# Patient Record
Sex: MALE | Race: WHITE | NOT HISPANIC OR LATINO | URBAN - METROPOLITAN AREA
[De-identification: names, ages, dates, MRNs, and addresses within clinical notes are randomized per-mention and may not be internally consistent; named-entity substitution may affect disease eponyms.]

---

## 2019-10-10 ENCOUNTER — EMERGENCY (EMERGENCY)
Facility: HOSPITAL | Age: 34
LOS: 1 days | Discharge: HOME | End: 2019-10-10
Attending: EMERGENCY MEDICINE
Payer: COMMERCIAL

## 2019-10-10 VITALS
SYSTOLIC BLOOD PRESSURE: 131 MMHG | DIASTOLIC BLOOD PRESSURE: 60 MMHG | OXYGEN SATURATION: 97 % | TEMPERATURE: 98 F | RESPIRATION RATE: 20 BRPM | WEIGHT: 279.99 LBS | HEIGHT: 71 IN | HEART RATE: 100 BPM

## 2019-10-10 VITALS
TEMPERATURE: 98 F | OXYGEN SATURATION: 98 % | DIASTOLIC BLOOD PRESSURE: 83 MMHG | SYSTOLIC BLOOD PRESSURE: 134 MMHG | HEART RATE: 98 BPM | RESPIRATION RATE: 20 BRPM

## 2019-10-10 PROCEDURE — 99283 EMERGENCY DEPT VISIT LOW MDM: CPT

## 2019-10-10 RX ORDER — CEPHALEXIN 500 MG
500 CAPSULE ORAL ONCE
Refills: 0 | Status: COMPLETED | OUTPATIENT
Start: 2019-10-10 | End: 2019-10-10

## 2019-10-10 RX ORDER — CEPHALEXIN 500 MG
1 CAPSULE ORAL
Qty: 28 | Refills: 0
Start: 2019-10-10 | End: 2019-10-16

## 2019-10-10 RX ADMIN — Medication 500 MILLIGRAM(S): at 15:10
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
## 2019-10-10 NOTE — ED PROVIDER NOTE - PATIENT PORTAL LINK FT
You can access the FollowMyHealth Patient Portal offered by [facility] by registering at the following website: [email or web address]. By joining [employer]’s FollowMyHealth portal, you will also be able to view your health information using other applications (apps) compatible with our system.

## 2019-10-10 NOTE — ED PROVIDER NOTE - NSFOLLOWUPCLINICS_GEN_ALL_ED_FT
[facility]  Burn  [address] - [address] [place] [address]  Phone: [phone]  Fax: [phone]  Follow Up Time:     [facility]  Burn  [address] [place] [id number]  Phone: [phone]  Fax:   Follow Up Time:     [facility]  Burn  [address], [address]  [place] [address]  Phone: [phone]  Fax:   Follow Up Time:

## 2019-10-12 ENCOUNTER — EMERGENCY (EMERGENCY)
Facility: HOSPITAL | Age: 34
LOS: 1 days | Discharge: HOME | End: 2019-10-12
Attending: EMERGENCY MEDICINE
Payer: COMMERCIAL

## 2019-10-12 VITALS
HEIGHT: 71 IN | SYSTOLIC BLOOD PRESSURE: 132 MMHG | DIASTOLIC BLOOD PRESSURE: 86 MMHG | HEART RATE: 102 BPM | WEIGHT: 278 LBS | TEMPERATURE: 99 F | RESPIRATION RATE: 17 BRPM | OXYGEN SATURATION: 97 %

## 2019-10-12 VITALS
TEMPERATURE: 98 F | DIASTOLIC BLOOD PRESSURE: 82 MMHG | RESPIRATION RATE: 18 BRPM | HEART RATE: 84 BPM | SYSTOLIC BLOOD PRESSURE: 118 MMHG | OXYGEN SATURATION: 98 %

## 2019-10-12 PROCEDURE — 99283 EMERGENCY DEPT VISIT LOW MDM: CPT

## 2019-10-12 RX ORDER — ACETAMINOPHEN 500 MG
975 TABLET ORAL ONCE
Refills: 0 | Status: COMPLETED | OUTPATIENT
Start: 2019-10-12 | End: 2019-10-12

## 2019-10-12 RX ADMIN — Medication 975 MILLIGRAM(S): at 15:15
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
## 2019-10-12 NOTE — ED PROVIDER NOTE - PATIENT PORTAL LINK FT
You can access the FollowMyHealth Patient Portal offered by [facility] by registering at the following website: [email or web address]. By joining [employer]’s FollowMyHealth portal, you will also be able to view your health information using other applications (apps) compatible with our system.
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
## 2021-05-07 NOTE — ED PROVIDER NOTE - CCCP TRG CHIEF CMPLNT
Chelo Nolasco is a 78year old male. Patient presents with:  Consult: former [facility] patient  CAD: hx PTCA w/ stent 1995, CABG X3  2005  Dyspnea    HPI:   Patient comes in today for consultation. He sees Dr. Mitzi Lorenz at [facility] for cardiology.   He for Chest pain. • omeprazole 20 MG Oral Capsule Delayed Release Take 40 mg by mouth every morning before breakfast.     • Vitamin D3 50 MCG (2000 UT) Oral Cap Take 2,000 Units by mouth daily. • Melatonin 10 MG Oral Tab Take 10 mg by mouth.        • history of coronary disease 1990s and bypass in 2005 has been fairly well been followed at [facility] by cardiologist over there.   Now is switching to [facility].  Having any chest pain but he is noting some shortness of breath fatigue when he is going up stairs left foot burn

## 2022-01-07 ENCOUNTER — EMERGENCY (EMERGENCY)
Facility: HOSPITAL | Age: 37
LOS: 1 days | Discharge: HOME | End: 2022-01-07
Attending: EMERGENCY MEDICINE
Payer: COMMERCIAL

## 2022-01-07 VITALS
HEIGHT: 71 IN | DIASTOLIC BLOOD PRESSURE: 80 MMHG | TEMPERATURE: 100 F | HEART RATE: 95 BPM | RESPIRATION RATE: 18 BRPM | OXYGEN SATURATION: 95 % | WEIGHT: 274.92 LBS | SYSTOLIC BLOOD PRESSURE: 119 MMHG

## 2022-01-07 PROCEDURE — 99284 EMERGENCY DEPT VISIT MOD MDM: CPT

## 2022-01-08 VITALS
DIASTOLIC BLOOD PRESSURE: 75 MMHG | SYSTOLIC BLOOD PRESSURE: 127 MMHG | HEART RATE: 80 BPM | RESPIRATION RATE: 17 BRPM | TEMPERATURE: 100 F | OXYGEN SATURATION: 99 %

## 2022-01-08 PROBLEM — Z78.9 OTHER SPECIFIED HEALTH STATUS: Chronic | Status: ACTIVE | Noted: 2019-10-12

## 2022-01-08 LAB
FLUAV AG NPH QL: SIGNIFICANT CHANGE UP
FLUBV AG NPH QL: SIGNIFICANT CHANGE UP
RSV RNA NPH QL NAA+NON-PROBE: SIGNIFICANT CHANGE UP
SARS-COV-2 RNA SPEC QL NAA+PROBE: DETECTED

## 2022-01-08 PROCEDURE — 99283 EMERGENCY DEPT VISIT LOW MDM: CPT

## 2022-01-08 PROCEDURE — 87637 SARSCOV2&INF A&B&RSV AMP PRB: CPT

## 2022-01-08 RX ORDER — IBUPROFEN 200 MG
1 TABLET ORAL
Qty: 20 | Refills: 0
Start: 2022-01-08

## 2022-01-08 RX ORDER — IBUPROFEN 200 MG
600 TABLET ORAL ONCE
Refills: 0 | Status: COMPLETED | OUTPATIENT
Start: 2022-01-08 | End: 2022-01-08

## 2022-01-08 RX ORDER — ONDANSETRON 8 MG/1
1 TABLET, FILM COATED ORAL
Qty: 10 | Refills: 0
Start: 2022-01-08

## 2022-01-08 RX ORDER — ONDANSETRON 8 MG/1
4 TABLET, FILM COATED ORAL ONCE
Refills: 0 | Status: COMPLETED | OUTPATIENT
Start: 2022-01-08 | End: 2022-01-08

## 2022-01-08 RX ADMIN — Medication 600 MILLIGRAM(S): at 01:07

## 2022-01-08 RX ADMIN — ONDANSETRON 4 MILLIGRAM(S): 8 TABLET, FILM COATED ORAL at 01:07

## 2022-01-08 NOTE — ED PROVIDER NOTE - NSFOLLOWUPINSTRUCTIONS_ED_ALL_ED_FT
You were seen and evaluated in the Emergency Department for your viral upper respiratory symptoms, possibly COVID. As we are not testing patients for COVID that are stable for discharge, you should self-quarantine for presumed COVID. Please see the attached COVID information packet for management of your symptoms, and more information on the next steps including your self-quarantine measures. Remaining self-quarantined is crucial to limiting the spread of the virus.  You may also refer to the CDC website for more information: https://www.cdc.gov/coronavirus/2019-ncov/if-you-are-sick/steps-when-sick.html.      - Do NOT go to work, school or public areas.  Avoid using public transportation, ride sharing or taxis.  - Wear a mask whenever you are around other people.  - Avoid sharing personal household items.  You should NOT share dishes, drinking glasses, cups, eating utensils, towels or bedding with other people or pets in your home.  After using these items they should be washed thoroughly.    - Avoid touching your face including your eyes, nose and mouth with your hands.  - Wash your hands often with soap and water for at least 20 seconds.  You can also clean your hands with an alcohol based  that contains at least 60-95% alcohol.  You should wash/clean all surfaces of your hands.  Use soap and water preferentially.    At this time your clinical evaluation and history do not demonstrate any acute, life-threatening medical conditions warranting emergent treatment.     Continue to maintain oral hydration with plenty of fluids.  You may take Tylenol (Acetaminophen) every 8 hours with food (following the instructions on the medication insert information sheet for dosing) for fever control.  Do not exceed 3000 mg in 24 hours of acetaminophen (Tylenol).      Take prescription ibuprofen 600 mg every 6 hours with food as needed for pain.  Do not take these medications if you do not have pain or if you have any history of bleeding disorder or, kidney disease. Do not use ibuprofen if you are on blood thinners (anti-coagulation).      Take prescription ondansetron (Zofran) up to 3 times a day as needed for nausea.     Should you develop new or worsening symptoms including but not limited to chest pain, shortness of breath, abdominal pain, fevers, vomiting, diarrhea - please return to the ED for immediate evaluation.

## 2022-01-08 NOTE — ED PROVIDER NOTE - INCLUDE COVID-19 DISCHARGE INSTRUCTIONS
Acute Otitis Media with Infection (Child)    Your child has a middle ear infection (acute otitis media). It is caused by bacteria or fungi. The middle ear is the space behind the eardrum. The eustachian tube connects the ear to the nasal passage. The eustachian tubes help drain fluid from the ears. They also keep the air pressure equal inside and outside the ears. These tubes are shorter and more horizontal in children. This makes it more likely for the tubes to become blocked. A blockage lets fluid and pressure build up in the middle ear. Bacteria or fungi can grow in this fluid and cause an ear infection. This infection is commonly known as an earache.  The main symptom of an ear infection is ear pain. Other symptoms may include pulling at the ear, being more fussy than usual, decreased appetite, and vomiting or diarrhea. Your childs hearing may also be affected. Your child may have had a respiratory infection first.  An ear infection may clear up on its own. Or your child may need to take medicine. After the infection goes away, your child may still have fluid in the middle ear. It may take weeks or months for this fluid to go away. During that time, your child may have temporary hearing loss. But all other symptoms of the earache should be gone.  Home care  Follow these guidelines when caring for your child at home:  · The healthcare provider will likely prescribe medicines for pain. The provider may also prescribe antibiotics or antifungals to treat the infection. These may be liquid medicines to give by mouth. Or they may be ear drops. Follow the providers instructions for giving these medicines to your child.  · Because ear infections can clear up on their own, the provider may suggest waiting for a few days before giving your child medicines for infection.  · To reduce pain, have your child rest in an upright position. Hot or cold compresses held against the ear may help ease pain.  · Keep the ear dry. 
Addended by: SAVAGE BILLINGSLEY on: 5/23/2019 07:52 AM     Modules accepted: Orders    
Have your child wear a shower cap when bathing.  To help prevent future infections:  · Avoid smoking near your child. Secondhand smoke raises the risk for ear infections in children.  · Make sure your child gets all appropriate vaccines.  · Do not bottle-feed while your baby is lying on his or her back. (This position can cause middle ear infections because it allows milk to run into the eustachian tubes.)      · If you breastfeed, continue until your child is 6 to 12 months of age.  To apply ear drops:  1. Put the bottle in warm water if the medicine is kept in the refrigerator. Cold drops in the ear are uncomfortable.  2. Have your child lie down on a flat surface. Gently hold your childs head to one side.  3. Remove any drainage from the ear with a clean tissue or cotton swab. Clean only the outer ear. Dont put the cotton swab into the ear canal.  4. Straighten the ear canal by gently pulling the earlobe up and back.  5. Keep the dropper a half-inch above the ear canal. This will keep the dropper from becoming contaminated. Put the drops against the side of the ear canal.  6. Have your child stay lying down for 2 to 3 minutes. This gives time for the medicine to enter the ear canal. If your child doesnt have pain, gently massage the outer ear near the opening.  7. Wipe any extra medicine away from the outer ear with a clean cotton ball.  Follow-up care  Follow up with your childs healthcare provider as directed. Your child will need to have the ear rechecked to make sure the infection has resolved. Check with your doctor to see when they want to see your child.  Special note to parents  If your child continues to get earaches, he or she may need ear tubes. The provider will put small tubes in your childs eardrum to help keep fluid from building up. This procedure is a simple and works well.  When to seek medical advice  Unless advised otherwise, call your child's healthcare provider if:  · Your child is 3 
months old or younger and has a fever of 100.4°F (38°C) or higher. Your child may need to see a healthcare provider.  · Your child is of any age and has fevers higher than 104°F (40°C) that come back again and again.  Call your child's healthcare provider for any of the following:  · New symptoms, especially swelling around the ear or weakness of face muscles  · Severe pain  · Infection seems to get worse, not better   · Neck pain  · Your child acts very sick or not himself or herself  · Fever or pain do not improve with antibiotics after 48 hours  Date Last Reviewed: 5/3/2015  © 7459-1814 [employer]. [address] [place] [address], [place] [address]. All rights reserved. This information is not intended as a substitute for professional medical care. Always follow your healthcare professional's instructions.        Viral Upper Respiratory Illness (Child)  Your child has a viral upper respiratory illness (URI), which is another term for the common cold. The virus is contagious during the first few days. It is spread through the air by coughing, sneezing, or by direct contact (touching your sick child then touching your own eyes, nose, or mouth). Frequent handwashing will decrease risk of spread. Most viral illnesses resolve within 7 to 14 days with rest and simple home remedies. However, they may sometimes last up to 4 weeks. Antibiotics will not kill a virus and are generally not prescribed for this condition.    Home care  · Fluids: Fever increases water loss from the body. Encourage your child to drink lots of fluids to loosen lung secretions and make it easier to breathe. For infants under 1 year old, continue regular formula or breast feedings. Between feedings, give oral rehydration solution. This is available from drugstores and grocery stores without a prescription. For children over 1 year old, give plenty of fluids, such as water, juice, gelatin water, soda without caffeine, ginger ale, lemonade, or 
ice pops.  · Eating: If your child doesn't want to eat solid foods, it's OK for a few days, as long as he or she drinks lots of fluid.  · Rest: Keep children with fever at home resting or playing quietly until the fever is gone. Encourage frequent naps. Your child may return to day care or school when the fever is gone and he or she is eating well and feeling better.  · Sleep: Periods of sleeplessness and irritability are common. A congested child will sleep best with the head and upper body propped up on pillows or with the head of the bed frame raised on a 6-inch block.   · Cough: Coughing is a normal part of this illness. A cool mist humidifier at the bedside may be helpful. Be sure to clean the humidifier every day to prevent mold. Over-the-counter cough and cold medicines have not proved to be any more helpful than a placebo (syrup with no medicine in it). In addition, these medicines can produce serious side effects, especially in infants under 2 years of age. Do not give over-the-counter cough and cold medicines to children under 6 years unless your healthcare provider has specifically advised you to do so. Also, dont expose your child to cigarette smoke. It can make the cough worse.  · Nasal congestion: Suction the nose of infants with a bulb syringe. You may put 2 to 3 drops of saltwater (saline) nose drops in each nostril before suctioning. This helps thin and remove secretions. Saline nose drops are available without a prescription. You can also use ¼ teaspoon of table salt dissolved in 1 cup of water.  · Fever: Use childrens acetaminophen for fever, fussiness, or discomfort, unless another medicine was prescribed. In infants over 6 months of age, you may use childrens ibuprofen or acetaminophen. (Note: If your child has chronic liver or kidney disease or has ever had a stomach ulcer or gastrointestinal bleeding, talk with your healthcare provider before using these medicines.) Aspirin should never be 
given to anyone younger than 18 years of age who is ill with a viral infection or fever. It may cause severe liver or brain damage.  · Preventing spread: Washing your hands before and after touching your sick child will help prevent a new infection. It will also help prevent the spread of this viral illness to yourself and other children.  Follow-up care  Follow up with your healthcare provider, or as advised.  When to seek medical advice  For a usually healthy child, call your child's healthcare provider right away if any of these occur:  · A fever, as follows:  ¨ Your child is 3 months old or younger and has a fever of 100.4°F (38°C) or higher. Get medical care right away. Fever in a young baby can be a sign of a dangerous infection.  ¨ Your child is of any age and has repeated fevers above 104°F (40°C).  ¨ Your child is younger than 2 years of age and a fever of 100.4°F (38°C) continues for more than 1 day.  ¨ Your child is 2 years old or older and a fever of 100.4°F (38°C) continues for more than 3 days.  · Earache, sinus pain, stiff or painful neck, headache, repeated diarrhea, or vomiting.  · Unusual fussiness.  · A new rash appears.  · Your child is dehydrated, with one or more of these symptoms:  ¨ No tears when crying.  ¨ Sunken eyes or a dry mouth.  ¨ No wet diapers for 8 hours in infants.  ¨ Reduced urine output in older children.  Call 911, or get immediate medical care  Contact emergency services if any of these occur:  · Increased wheezing or difficulty breathing  · Unusual drowsiness or confusion  · Fast breathing, as follows:  ¨ Birth to 6 weeks: over 60 breaths per minute.  ¨ 6 weeks to 2 years: over 45 breaths per minute.  ¨ 3 to 6 years: over 35 breaths per minute.  ¨ 7 to 10 years: over 30 breaths per minute.  ¨ Older than 10 years: over 25 breaths per minute.  Date Last Reviewed: 9/13/2015  © 8346-6893 [employer]. [address] [place] [address], [place] [address]. All rights reserved. 
This information is not intended as a substitute for professional medical care. Always follow your healthcare professional's instructions.        
<-------- Click here to INCLUDE CoVID-19 Discharge Instructions

## 2022-01-08 NOTE — ED PROVIDER NOTE - CLINICAL SUMMARY MEDICAL DECISION MAKING FREE TEXT BOX
Efrem-PGY3: 36 year old male with no pertinent PMH presents with sore throat x 1 day. Pt reports sore throat, nasal congestion, dry cough, subjective fevers, chills, and nausea x 1 day. Denies any chest pain, shortness of breath, abdominal pain, vomiting, diarrhea, bloody stools, black tarry stools, dysuria, vision change, voice change, drooling, numbness, weakness, or rash. Pt states he's vaccinated against COVID-19, reports ill contact. Pt reports taking Tylenol with little improvement. Tolerating PO intake. Symptoms and exam consistent with likely viral illness, possible COVID-19. Vital signs non-actionable. Will provide symptomatic treatment and likely DC with PMD follow up and return precautions. Pt understood and agreeable with plan.

## 2022-01-08 NOTE — ED PROVIDER NOTE - PHYSICAL EXAMINATION
CONSTITUTIONAL: non-toxic, well appearing  SKIN: no rash, no petechiae.  EYES: pink conjunctiva, anicteric  ENT: tongue and uvular midline, no erythema, no exudates, moist mucous membranes  NECK: Supple; no meningismus, no JVD  CARD: RRR, no murmurs, equal radial pulses bilaterally 2+  RESP: CTAB, no respiratory distress  ABD: Soft, non-tender, non-distended, no peritoneal signs, no CVA tenderness  EXT: Normal ROM x4. No edema. No calf tenderness  NEURO: Alert, oriented. Neuro exam nonfocal, equal strength bilaterally  PSYCH: Cooperative, appropriate.

## 2022-01-08 NOTE — ED PROVIDER NOTE - OBJECTIVE STATEMENT
36 year old male with no pertinent PMH presents with sore throat x 1 day. Pt reports sore throat, nasal congestion, dry cough, subjective fevers, chills, and nausea x 1 day. Denies any chest pain, shortness of breath, abdominal pain, vomiting, diarrhea, bloody stools, black tarry stools, dysuria, vision change, voice change, drooling, numbness, weakness, or rash. Pt states he's vaccinated against COVID-19, reports ill contact. Pt reports taking Tylenol with little improvement. Tolerating PO intake. Denies any additional complaints.

## 2022-01-08 NOTE — ED PROVIDER NOTE - PATIENT PORTAL LINK FT
You can access the FollowMyHealth Patient Portal offered by [facility] by registering at the following website: [email or web address]. By joining [facility]’s FollowMyHealth portal, you will also be able to view your health information using other applications (apps) compatible with our system.

## 2022-01-08 NOTE — ED ADULT NURSE NOTE - OBJECTIVE STATEMENT
Pt is a 37y/o male A&Ox3 speaking coherently ambulates independently ID verified who comes in via self w/ c/o sore throat, fever, headache, congestion, body aches/back pain, nausea. Denies any medical/sx hx. Rapid covid test negative, pcr pending. Double vaccinated, no booster. Denies smoking but states he does "vape." Has been taking Tylenol with partial relief. States he came in because he "doesn't know if they did the test right so wanted to get tested again." Denies any chest pain or SOB, does not appear to be in any acute distress. Denies vision changes, abdominal pain, vomiting, diarrhea, dysuria, hematuria, recent illness travel or fall. Proper precautions in place. All needs met. Safety and comfort measures provided. Bed locked and in lowest position, side rails up for safety. Call bell within reach.

## 2022-01-08 NOTE — ED PROVIDER NOTE - NS ED ROS FT
Review of Systems    Constitutional: (+) fever, (+) chills, (+) fatigue  HEENT: (+) sore throat, (-) hearing loss, (+) nasal congestion  Cardiovascular: (-) chest pain, (-) syncope  Respiratory: (+) cough, (-) shortness of breath  Gastrointestinal: (-) vomiting, (-) diarrhea, (-) abdominal pain  Musculoskeletal: (-) neck pain, (-) back pain, (-) joint pain  Integumentary: (-) rash, (-) edema, (-) wound  Neurological: (+) headache, (-) altered mental status    Except as documented in the HPI, all other systems are negative.

## 2023-01-06 NOTE — ED ADULT TRIAGE NOTE - HEIGHT IN FEET
Please follow-up with your primary care provider in within one week for recheck of your symptoms. Return to the ED immediately if you have worse or continued chest pain, loss of consciousness, shortness of breath, nausea, sweating during chest pain, trouble breathing, chest pain with exertion (climbing stairs or walking for example), or any other concerns.
5

## 2023-01-12 NOTE — ED ADULT NURSE NOTE - MUSCULOSKELETAL WDL
Full range of motion of upper and lower extremities, no joint tenderness/swelling. Topical Sulfur Applications Counseling: Topical Sulfur Counseling: Patient counseled that this medication may cause skin irritation or allergic reactions.  In the event of skin irritation, the patient was advised to reduce the amount of the drug applied or use it less frequently.   The patient verbalized understanding of the proper use and possible adverse effects of topical sulfur application.  All of the patient's questions and concerns were addressed.

## 2025-03-11 NOTE — ED ADULT NURSE NOTE - CAS DISCH CONDITION
Khris Armijo will be covering me starting 3/12/25. He can be reached at  if needed.     - Dr. SHIRA Leroy ([facility])  - [phone] Stable